# Patient Record
Sex: FEMALE | Race: WHITE | NOT HISPANIC OR LATINO | Employment: FULL TIME | URBAN - METROPOLITAN AREA
[De-identification: names, ages, dates, MRNs, and addresses within clinical notes are randomized per-mention and may not be internally consistent; named-entity substitution may affect disease eponyms.]

---

## 2022-03-28 ENCOUNTER — TRANSFERRED RECORDS (OUTPATIENT)
Dept: MULTI SPECIALTY CLINIC | Facility: CLINIC | Age: 37
End: 2022-03-28

## 2022-03-28 LAB — PAP-ABSTRACT: NORMAL

## 2023-12-08 ENCOUNTER — ANCILLARY PROCEDURE (OUTPATIENT)
Dept: MAMMOGRAPHY | Facility: CLINIC | Age: 38
End: 2023-12-08
Attending: NURSE PRACTITIONER
Payer: COMMERCIAL

## 2023-12-08 DIAGNOSIS — N63.20 LEFT BREAST MASS: ICD-10-CM

## 2023-12-08 PROCEDURE — G0279 TOMOSYNTHESIS, MAMMO: HCPCS | Performed by: RADIOLOGY

## 2023-12-08 PROCEDURE — 77066 DX MAMMO INCL CAD BI: CPT | Performed by: RADIOLOGY

## 2023-12-08 PROCEDURE — 76642 ULTRASOUND BREAST LIMITED: CPT | Mod: LT | Performed by: RADIOLOGY

## 2024-05-06 ENCOUNTER — OFFICE VISIT (OUTPATIENT)
Dept: PEDIATRICS | Facility: CLINIC | Age: 39
End: 2024-05-06
Payer: COMMERCIAL

## 2024-05-06 VITALS
HEIGHT: 64 IN | WEIGHT: 140.6 LBS | TEMPERATURE: 97.6 F | BODY MASS INDEX: 24.01 KG/M2 | RESPIRATION RATE: 16 BRPM | HEART RATE: 57 BPM | SYSTOLIC BLOOD PRESSURE: 108 MMHG | DIASTOLIC BLOOD PRESSURE: 69 MMHG | OXYGEN SATURATION: 100 %

## 2024-05-06 DIAGNOSIS — M25.561 ACUTE PAIN OF RIGHT KNEE: Primary | ICD-10-CM

## 2024-05-06 PROCEDURE — 99203 OFFICE O/P NEW LOW 30 MIN: CPT | Performed by: STUDENT IN AN ORGANIZED HEALTH CARE EDUCATION/TRAINING PROGRAM

## 2024-05-06 ASSESSMENT — PAIN SCALES - GENERAL: PAINLEVEL: NO PAIN (0)

## 2024-05-06 NOTE — PROGRESS NOTES
Assessment & Plan     Acute pain of right knee  Right medial knee pain and effusion after contact injury during soccer a few days ago. Differential diagnosis includes medial meniscal tear, MCL tear or sprain, ACL tear. No joint line tenderness and patella with normal movement so low suspicion for fracture or dislocation and do not think X-ray would be beneficial. Recommend MRI to evaluate for ligamentous or other injuries, physical therapy, and seeing orthopedic provider if a tear present on MRI. In meantime NSAIDs, ice PRN, and patient has crutches and knee brace from 's ACL surgery. Discussed plan of care and reasons to return to clinic or present to the ED (emergency department). Patient and/or guardian in agreement with plan.  - MR Knee Right w/o Contrast; Future  - Physical Therapy  Referral; Future  - Orthopedic  Referral; Future                  Subjective   Ladonna is a 38 year old, presenting for the following health issues:  Knee Injury        5/6/2024     8:01 AM   Additional Questions   Roomed by Emi Bates   Accompanied by N/A     Via the Health Maintenance questionnaire, the patient has reported the   following services have been completed -Cervical Cancer Screening, this   information has been sent to the abstraction team.    History of Present Illness       Reason for visit:  Soccer-related injury on right knee  Symptom onset:  1-3 days ago  Symptoms include:  Feeling unstable in lateral movement of knee  Symptom intensity:  Moderate  Symptom progression:  Staying the same  Had these symptoms before:  No  What makes it worse:  Dropping the knee to the left without support  What makes it better:  Icing and moving the knee gently are encouraging    She eats 4 or more servings of fruits and vegetables daily.She consumes 0 sweetened beverage(s) daily.She exercises with enough effort to increase her heart rate 30 to 60 minutes per day.  She exercises with enough effort to  "increase her heart rate 4 days per week.   She is taking medications regularly.     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  Physical Exam   GENERAL: alert and no distress  ORTHO: right Knee Exam: Inspection: AP/lateral alignment normal, small effusion medially, No quad atrophy  Non-tender: lateral patellar facet, medial patellar facet, patella tendon, quadriceps insertion, lateral joint line, medial joint line, medial femoral condyle, lateral femoral condyle, popliteal region  Active Range of Motion: decreased flexion 120 degrees due to swelling, full extension, no pain with extension  SKIN: no suspicious lesions or rashes            Signed Electronically by: Deirdre E. Milligan, MD    "

## 2024-05-06 NOTE — LETTER
May 6, 2024      Ladonna Cardenas  [address]  [address]  [facility] [place] [address]        To Whom It May Concern:    Ladonna Cardenas was seen on 05/06/2024. Due to injury we recommend no walking without crutches, no bending at the knees or twisting of knees, and no lifting for 2 weeks through 05/20/2024.        Sincerely,        Deirdre E. Milligan, MD

## 2024-05-09 ENCOUNTER — MYC MEDICAL ADVICE (OUTPATIENT)
Dept: PEDIATRICS | Facility: CLINIC | Age: 39
End: 2024-05-09
Payer: COMMERCIAL

## 2024-05-12 ENCOUNTER — HEALTH MAINTENANCE LETTER (OUTPATIENT)
Age: 39
End: 2024-05-12

## 2024-05-12 ASSESSMENT — ACTIVITIES OF DAILY LIVING (ADL)
GO DOWN STAIRS: ACTIVITY IS SOMEWHAT DIFFICULT
SWELLING: THE SYMPTOM AFFECTS MY ACTIVITY SEVERELY
KNEEL ON THE FRONT OF YOUR KNEE: I AM UNABLE TO DO THE ACTIVITY
PAIN: THE SYMPTOM AFFECTS MY ACTIVITY SLIGHTLY
STIFFNESS: THE SYMPTOM AFFECTS MY ACTIVITY SEVERELY
SQUAT: I AM UNABLE TO DO THE ACTIVITY
SIT WITH YOUR KNEE BENT: I AM UNABLE TO DO THE ACTIVITY
HOW_WOULD_YOU_RATE_THE_CURRENT_FUNCTION_OF_YOUR_KNEE_DURING_YOUR_USUAL_DAILY_ACTIVITIES_ON_A_SCALE_FROM_0_TO_100_WITH_100_BEING_YOUR_LEVEL_OF_KNEE_FUNCTION_PRIOR_TO_YOUR_INJURY_AND_0_BEING_THE_INABILITY_TO_PERFORM_ANY_OF_YOUR_USUAL_DAILY_ACTIVITIES?: 43
SQUAT: I AM UNABLE TO DO THE ACTIVITY
SWELLING: THE SYMPTOM AFFECTS MY ACTIVITY SEVERELY
GIVING WAY, BUCKLING OR SHIFTING OF KNEE: THE SYMPTOM AFFECTS MY ACTIVITY SEVERELY
SIT WITH YOUR KNEE BENT: I AM UNABLE TO DO THE ACTIVITY
KNEE_ACTIVITY_OF_DAILY_LIVING_SCORE: 35.71
WEAKNESS: THE SYMPTOM AFFECTS MY ACTIVITY SEVERELY
STIFFNESS: THE SYMPTOM AFFECTS MY ACTIVITY SEVERELY
PAIN: THE SYMPTOM AFFECTS MY ACTIVITY SLIGHTLY
WEAKNESS: THE SYMPTOM AFFECTS MY ACTIVITY SEVERELY
RISE FROM A CHAIR: ACTIVITY IS MINIMALLY DIFFICULT
WALK: ACTIVITY IS SOMEWHAT DIFFICULT
GIVING WAY, BUCKLING OR SHIFTING OF KNEE: THE SYMPTOM AFFECTS MY ACTIVITY SEVERELY
KNEE_ACTIVITY_OF_DAILY_LIVING_SUM: 25
GO UP STAIRS: ACTIVITY IS SOMEWHAT DIFFICULT
GO DOWN STAIRS: ACTIVITY IS SOMEWHAT DIFFICULT
LIMPING: THE SYMPTOM AFFECTS MY ACTIVITY SEVERELY
AS_A_RESULT_OF_YOUR_KNEE_INJURY,_HOW_WOULD_YOU_RATE_YOUR_CURRENT_LEVEL_OF_DAILY_ACTIVITY?: SEVERELY ABNORMAL
STAND: ACTIVITY IS MINIMALLY DIFFICULT
GO UP STAIRS: ACTIVITY IS SOMEWHAT DIFFICULT
RAW_SCORE: 25
KNEEL ON THE FRONT OF YOUR KNEE: I AM UNABLE TO DO THE ACTIVITY
LIMPING: THE SYMPTOM AFFECTS MY ACTIVITY SEVERELY
HOW_WOULD_YOU_RATE_THE_OVERALL_FUNCTION_OF_YOUR_KNEE_DURING_YOUR_USUAL_DAILY_ACTIVITIES?: ABNORMAL
AS_A_RESULT_OF_YOUR_KNEE_INJURY,_HOW_WOULD_YOU_RATE_YOUR_CURRENT_LEVEL_OF_DAILY_ACTIVITY?: SEVERELY ABNORMAL
WALK: ACTIVITY IS SOMEWHAT DIFFICULT
HOW_WOULD_YOU_RATE_THE_OVERALL_FUNCTION_OF_YOUR_KNEE_DURING_YOUR_USUAL_DAILY_ACTIVITIES?: ABNORMAL
PLEASE_INDICATE_YOR_PRIMARY_REASON_FOR_REFERRAL_TO_THERAPY:: KNEE
RISE FROM A CHAIR: ACTIVITY IS MINIMALLY DIFFICULT
HOW_WOULD_YOU_RATE_THE_CURRENT_FUNCTION_OF_YOUR_KNEE_DURING_YOUR_USUAL_DAILY_ACTIVITIES_ON_A_SCALE_FROM_0_TO_100_WITH_100_BEING_YOUR_LEVEL_OF_KNEE_FUNCTION_PRIOR_TO_YOUR_INJURY_AND_0_BEING_THE_INABILITY_TO_PERFORM_ANY_OF_YOUR_USUAL_DAILY_ACTIVITIES?: 43
STAND: ACTIVITY IS MINIMALLY DIFFICULT

## 2024-05-13 ENCOUNTER — THERAPY VISIT (OUTPATIENT)
Dept: PHYSICAL THERAPY | Facility: CLINIC | Age: 39
End: 2024-05-13
Attending: STUDENT IN AN ORGANIZED HEALTH CARE EDUCATION/TRAINING PROGRAM
Payer: COMMERCIAL

## 2024-05-13 DIAGNOSIS — M25.561 ACUTE PAIN OF RIGHT KNEE: ICD-10-CM

## 2024-05-13 DIAGNOSIS — M25.661 STIFFNESS OF KNEE JOINT, RIGHT: Primary | ICD-10-CM

## 2024-05-13 PROCEDURE — 97110 THERAPEUTIC EXERCISES: CPT | Mod: GP

## 2024-05-13 PROCEDURE — 97161 PT EVAL LOW COMPLEX 20 MIN: CPT | Mod: GP

## 2024-05-13 NOTE — PROGRESS NOTES
PHYSICAL THERAPY EVALUATION  Type of Visit: Evaluation    See electronic medical record for Abuse and Falls Screening details.    Subjective   Pt is a 39 yo female presenting to PT with R knee pain. Pt states she was playing on her co-ed soccer team on 5/4/24 when she went up to stop someone from shooting the ball; as the person shot the ball, her knee was pushed into a valgus stress position. She heard a small tear and had immediate pain but she was able to walk off the field; began to have swelling 2-3 days later. Her pain is located medially; she experiences tweaking and sensations of instability with her knee bent in certain sleeping/sitting positions, and with walking. She also has sensations of weakness and stiffness in the knee; she wears a knee brace when walking to help this. She takes ibuprofen occasionally before bed to help sleep. She works as an agricultural researcher; is unable to walk/bend/squat in the field. She also bikes to work but has been unable. She can take the bus and has crutches to assist her for prolonged ambulation if needed. She wishes to return to soccer, walking, dancing, running, teaching yoga, and weight lifting. This is her first knee injury. She has a history of R ankle sprain 2 yrs ago, and R hamstring tear 15 yrs ago, and chronic R hip discomfort. She has an MRI scheduled for 5/22 but may have to re-schedule for insurance purposes.        Presenting condition or subjective complaint: Weak right knee after suffering a soccer injury  Date of onset: 05/04/24    Relevant medical history: Anemia   Dates & types of surgery:      Prior diagnostic imaging/testing results:       Prior therapy history for the same diagnosis, illness or injury: No      Prior Level of Function  Transfers: Independent  Ambulation: Independent  ADL: Independent    Living Environment  Social support: With a significant other or spouse   Type of home: Apartment/condo   Stairs to enter the home: Yes 2 Is there a  railing: Yes   Ramp: No   Stairs inside the home: Yes 8 Is there a railing: Yes   Help at home: None  Equipment owned: Crutches     Employment: Yes Agricultural Researcher  Hobbies/Interests: Soccer, biking, yoga, walking, running, dancing    Patient goals for therapy: I would like to return to my active lifestyle. I need to be able to lift, squat, walk for work.    Pain assessment: Pain present     Objective   KNEE EVALUATION  INTEGUMENTARY (edema, incisions):  R knee effusion present: does not spontaneously refill.  GAIT:  Gait Deviations:  Has a lack of TKE on R in static stance, and with ambulation  BALANCE/PROPRIOCEPTION: Single Leg Stance Eyes Open (seconds): 30 sec B but increased instability and mistrust on R knee  ROM:  knee: 0-135 L , 0-60 R. Ankle DF WNL and symmetrical. L hip WNL, R hip NT d/t dec knee flexion.  STRENGTH:  Fair quad activation. 4/5 hip abd B.  FLEXIBILITY: WNL  SPECIAL TESTS:  + valgus stress test for slight medial knee pain  FUNCTIONAL TESTS:  DL squat: hesitant, performs small knee bend, slight discomfort  PALPATION:  slight tenderness to medial joint line/MCL.   JOINT MOBILITY:  Patella WNL    Assessment & Plan   CLINICAL IMPRESSIONS  Medical Diagnosis: Acute pain R knee    Treatment Diagnosis: R knee pain, weakness   Impression/Assessment: Patient is a 38 year old female with R knee pain complaints.  The following significant findings have been identified: Pain, Decreased ROM/flexibility, Decreased strength, Impaired gait, Impaired muscle performance, and Decreased activity tolerance. These impairments interfere with their ability to perform self care tasks, work tasks, recreational activities, household chores, driving , household mobility, and community mobility as compared to previous level of function.     Clinical Decision Making (Complexity):  Clinical Presentation: Stable/Uncomplicated  Clinical Presentation Rationale: based on medical and personal factors listed in PT  evaluation  Clinical Decision Making (Complexity): Low complexity    PLAN OF CARE  Treatment Interventions:  Modalities: Cryotherapy, E-stim  Interventions: Gait Training, Manual Therapy, Neuromuscular Re-education, Therapeutic Activity, Therapeutic Exercise    Long Term Goals     PT Goal 1  Goal Identifier: ROM  Goal Description: Pt will attain full and symmetrical ROM on her R knee as compared to her L  Rationale: to maximize safety and independence with performance of ADLs and functional tasks;to maximize safety and independence within the home;to maximize safety and independence within the community;to maximize safety and independence with transportation  Goal Progress: 0-60 R, 0-135 L  Target Date: 07/08/24      Frequency of Treatment: 1x/wk  Duration of Treatment: 8-10 wks    Recommended Referrals to Other Professionals:  none  Education Assessment:   Learner/Method: Patient;Demonstration;Pictures/Video    Risks and benefits of evaluation/treatment have been explained.   Patient/Family/caregiver agrees with Plan of Care.     Evaluation Time:     PT Eval, Low Complexity Minutes (09107): 25     Signing Clinician: JESSY MORAES PT

## 2024-05-24 ENCOUNTER — THERAPY VISIT (OUTPATIENT)
Dept: PHYSICAL THERAPY | Facility: CLINIC | Age: 39
End: 2024-05-24
Attending: STUDENT IN AN ORGANIZED HEALTH CARE EDUCATION/TRAINING PROGRAM
Payer: COMMERCIAL

## 2024-05-24 DIAGNOSIS — M25.661 STIFFNESS OF KNEE JOINT, RIGHT: ICD-10-CM

## 2024-05-24 DIAGNOSIS — M25.561 ACUTE PAIN OF RIGHT KNEE: Primary | ICD-10-CM

## 2024-05-24 PROCEDURE — 97530 THERAPEUTIC ACTIVITIES: CPT | Mod: GP

## 2024-05-24 PROCEDURE — 97110 THERAPEUTIC EXERCISES: CPT | Mod: 59

## 2024-05-31 ENCOUNTER — THERAPY VISIT (OUTPATIENT)
Dept: PHYSICAL THERAPY | Facility: CLINIC | Age: 39
End: 2024-05-31
Attending: STUDENT IN AN ORGANIZED HEALTH CARE EDUCATION/TRAINING PROGRAM
Payer: COMMERCIAL

## 2024-05-31 DIAGNOSIS — M25.661 STIFFNESS OF KNEE JOINT, RIGHT: ICD-10-CM

## 2024-05-31 DIAGNOSIS — M25.561 ACUTE PAIN OF RIGHT KNEE: Primary | ICD-10-CM

## 2024-05-31 PROCEDURE — 97530 THERAPEUTIC ACTIVITIES: CPT | Mod: GP

## 2024-05-31 PROCEDURE — 97140 MANUAL THERAPY 1/> REGIONS: CPT | Mod: GP

## 2024-05-31 PROCEDURE — 97110 THERAPEUTIC EXERCISES: CPT | Mod: GP

## 2024-06-12 ENCOUNTER — THERAPY VISIT (OUTPATIENT)
Dept: PHYSICAL THERAPY | Facility: CLINIC | Age: 39
End: 2024-06-12
Payer: COMMERCIAL

## 2024-06-12 DIAGNOSIS — M25.661 STIFFNESS OF KNEE JOINT, RIGHT: ICD-10-CM

## 2024-06-12 DIAGNOSIS — M25.561 ACUTE PAIN OF RIGHT KNEE: Primary | ICD-10-CM

## 2024-06-12 PROCEDURE — 97530 THERAPEUTIC ACTIVITIES: CPT | Mod: GP

## 2024-06-12 PROCEDURE — 97110 THERAPEUTIC EXERCISES: CPT | Mod: GP

## 2024-06-20 ENCOUNTER — THERAPY VISIT (OUTPATIENT)
Dept: PHYSICAL THERAPY | Facility: CLINIC | Age: 39
End: 2024-06-20
Payer: COMMERCIAL

## 2024-06-20 DIAGNOSIS — M25.561 ACUTE PAIN OF RIGHT KNEE: Primary | ICD-10-CM

## 2024-06-20 DIAGNOSIS — M25.661 STIFFNESS OF KNEE JOINT, RIGHT: ICD-10-CM

## 2024-06-20 PROCEDURE — 97530 THERAPEUTIC ACTIVITIES: CPT | Mod: GP

## 2024-06-20 PROCEDURE — 97110 THERAPEUTIC EXERCISES: CPT | Mod: GP

## 2024-07-11 ENCOUNTER — THERAPY VISIT (OUTPATIENT)
Dept: PHYSICAL THERAPY | Facility: CLINIC | Age: 39
End: 2024-07-11
Payer: COMMERCIAL

## 2024-07-11 DIAGNOSIS — M25.561 ACUTE PAIN OF RIGHT KNEE: Primary | ICD-10-CM

## 2024-07-11 DIAGNOSIS — M25.661 STIFFNESS OF KNEE JOINT, RIGHT: ICD-10-CM

## 2024-07-11 PROCEDURE — 97530 THERAPEUTIC ACTIVITIES: CPT | Mod: GP

## 2024-07-11 PROCEDURE — 97110 THERAPEUTIC EXERCISES: CPT | Mod: GP

## 2024-08-15 ENCOUNTER — THERAPY VISIT (OUTPATIENT)
Dept: PHYSICAL THERAPY | Facility: CLINIC | Age: 39
End: 2024-08-15
Payer: COMMERCIAL

## 2024-08-15 DIAGNOSIS — M25.661 STIFFNESS OF KNEE JOINT, RIGHT: ICD-10-CM

## 2024-08-15 DIAGNOSIS — M25.561 ACUTE PAIN OF RIGHT KNEE: Primary | ICD-10-CM

## 2024-08-15 PROCEDURE — 97530 THERAPEUTIC ACTIVITIES: CPT | Mod: GP

## 2024-08-15 NOTE — PROGRESS NOTES
08/15/24 0500   Appointment Info   Signing clinician's name / credentials Luis Manuel Santana DPT   Total/Authorized Visits 10 (per PT)   Visits Used 7   Medical Diagnosis Acute pain R knee   PT Tx Diagnosis R knee pain, weakness   Progress Note/Certification   Onset of illness/injury or Date of Surgery 05/04/24   Therapy Frequency 1x/wk   Predicted Duration 8-10 wks   PT Goal 1   Goal Identifier ROM   Goal Description Pt will attain full and symmetrical ROM on her R knee as compared to her L   Rationale to maximize safety and independence with performance of ADLs and functional tasks;to maximize safety and independence within the home;to maximize safety and independence within the community;to maximize safety and independence with transportation   Goal Progress 0-130 R, 0-135 L   Target Date 07/08/24   Subjective Report   Subjective Report Knee is feeling pretty good; has still been biking, started jogging a few laps in gym <.5 mi and plans to gradually increase. Starting yoga again, can sit back on heels in kneeling. Crouching > 90 deg can still cause some anterior knee discomfort.   Therapeutic Activity   Therapeutic Activities: dynamic activities to improve functional performance minutes (66171) 38   Therapeutic Activities Ther Act 8   Ther Act 1 Review of status, HEP, POC, testing   Ther Act 1 - Details discussion of how to progress activity further. Build up jogging tolerance. Once you can tolerate 2 mi of jogging, build up sprint with longer strides and progressive speed. Then try soccer related higher level drills.   Ther Act 2 dynamic warm up   Ther Act 2 - Details high knees, butt kicks, skips, grapevines   Ther Act 3 Lateral bound   Ther Act 3 - Details x10   Ther Act 4 Elio jumping   Ther Act 4 - Details DL fwd and sideways x2 rounds ea. SL fwd and sidwys x2 rounds   Ther Act 5 Blaze pod shuffles   Ther Act 5 - Details 2x30 seconds   Skilled Intervention verbal cueing, demo, education   Patient  Response/Progress tolerates well, is cautious at first but becomes more comfortable with increased time. Feels slight instability in R knee   Ther Act 6 forwards and backwards jog to different color balls yelled out by PT   Ther Act 8 Soccer dribbling around cones   Ther Act 8 - Details x2 min   Ther Act 6 - Details x30 seconds   Ther Act 7 Box run including forward jog, side shuffle, backwards jog, COD   Ther Act 7 - Details 3x ea direction   Education   Learner/Method Patient;Demonstration;Pictures/Video   Plan   Home program PTRX   Plan for next session discharged       DISCHARGE  Reason for Discharge: Pt has progressed well with physical therapy. She demonstrates full R knee ROM, and is able to touch her butt to heels while kneeling; she has occasional anterior knee discomfort in knee flexion angles > 100 degrees while crouching but this is relieved once out of the position. She demonstrates a 96.8% quad LSI, and 72% hamstring LSI (she has a history of R hamstring injury that decreases this symmetry); she has been provided with a HEP to address continued LE strengthening to further improve these symmetries. She is tolerating plyometrics in multiple plans without discomfort, and tolerated light agility today without discomfort. She only had some mild sensations of instability. She will continue to progress her jogging distance at home, then build into sprints, and then incorporate higher level soccer drills as desired.     Equipment Issued: none    Discharge Plan: Patient to continue home program.    Referring Provider:  Deirdre E Milligan

## 2025-05-18 ENCOUNTER — HEALTH MAINTENANCE LETTER (OUTPATIENT)
Age: 40
End: 2025-05-18